# Patient Record
Sex: MALE | Race: WHITE | NOT HISPANIC OR LATINO | URBAN - METROPOLITAN AREA
[De-identification: names, ages, dates, MRNs, and addresses within clinical notes are randomized per-mention and may not be internally consistent; named-entity substitution may affect disease eponyms.]

---

## 2017-04-01 ENCOUNTER — EMERGENCY (EMERGENCY)
Facility: HOSPITAL | Age: 37
LOS: 0 days | Discharge: HOME | End: 2017-04-01
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
## 2017-06-27 DIAGNOSIS — S46.911A STRAIN OF UNSPECIFIED MUSCLE, FASCIA AND TENDON AT SHOULDER AND UPPER ARM LEVEL, RIGHT ARM, INITIAL ENCOUNTER: ICD-10-CM

## 2017-06-27 DIAGNOSIS — Z87.891 PERSONAL HISTORY OF NICOTINE DEPENDENCE: ICD-10-CM
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
## 2017-06-27 DIAGNOSIS — S49.91XA UNSPECIFIED INJURY OF RIGHT SHOULDER AND UPPER ARM, INITIAL ENCOUNTER: ICD-10-CM
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
## 2018-04-07 ENCOUNTER — EMERGENCY (EMERGENCY)
Facility: HOSPITAL | Age: 38
LOS: 0 days | Discharge: HOME | End: 2018-04-07

## 2018-04-07 VITALS
RESPIRATION RATE: 18 BRPM | HEART RATE: 113 BPM | DIASTOLIC BLOOD PRESSURE: 110 MMHG | TEMPERATURE: 98 F | SYSTOLIC BLOOD PRESSURE: 191 MMHG

## 2018-04-07 DIAGNOSIS — M54.5 LOW BACK PAIN: ICD-10-CM
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
## 2018-04-07 DIAGNOSIS — S39.012A STRAIN OF MUSCLE, FASCIA AND TENDON OF LOWER BACK, INITIAL ENCOUNTER: ICD-10-CM

## 2018-04-07 RX ORDER — METHOCARBAMOL 500 MG/1
2 TABLET, FILM COATED ORAL
Qty: 40 | Refills: 0 | OUTPATIENT
Start: 2018-04-07 | End: 2018-04-16

## 2018-04-07 RX ORDER — METHOCARBAMOL 500 MG/1
1000 TABLET, FILM COATED ORAL ONCE
Qty: 0 | Refills: 0 | Status: COMPLETED | OUTPATIENT
Start: 2018-04-07 | End: 2018-04-07

## 2018-04-07 RX ORDER — KETOROLAC TROMETHAMINE 30 MG/ML
30 SYRINGE (ML) INJECTION ONCE
Qty: 0 | Refills: 0 | Status: DISCONTINUED | OUTPATIENT
Start: 2018-04-07 | End: 2018-04-07

## 2018-04-07 RX ADMIN — METHOCARBAMOL 1000 MILLIGRAM(S): 500 TABLET, FILM COATED ORAL at 10:51

## 2018-04-07 RX ADMIN — Medication 30 MILLIGRAM(S): at 10:50

## 2018-04-07 NOTE — ED PROVIDER NOTE - PHYSICAL EXAMINATION
CONST: Well appearing in NAD  NECK: Non-tender, no meningeal signs  CARD: Normal S1 S2; Normal rate and rhythm  RESP: Equal BS B/L, No wheezes, rhonchi or rales. No distress  MS: Normal ROM in all extremities. No midline spinal tenderness. +R lumbar muscle tenderness.

## 2018-04-07 NOTE — ED PROVIDER NOTE - OBJECTIVE STATEMENT
Pt is a 36 yo M c/o R lower back pain after lifting a heavy patient just PTA. 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 Felt straining in lower back which is worse with movement. No radiation into LE's. No numbness or tingling.

## 2018-04-07 NOTE — ED PROVIDER NOTE - NS ED ROS FT
CONST: No fever, chills or bodyaches  EYES: No pain, redness, drainage or visual changes.  ENT: No ear pain or discharge, nasal discharge or congestion. No sore throat  CARD: No chest pain, palpitations  RESP: No SOB, cough, hemoptysis. No hx of asthma or COPD  MS: +Back pain

## 2018-11-14 ENCOUNTER — EMERGENCY (EMERGENCY)
Facility: HOSPITAL | Age: 38
LOS: 0 days | Discharge: HOME | End: 2018-11-14
Attending: EMERGENCY MEDICINE | Admitting: EMERGENCY MEDICINE

## 2018-11-14 VITALS
DIASTOLIC BLOOD PRESSURE: 94 MMHG | RESPIRATION RATE: 16 BRPM | TEMPERATURE: 97 F | SYSTOLIC BLOOD PRESSURE: 162 MMHG | WEIGHT: 240.08 LBS | HEART RATE: 94 BPM | OXYGEN SATURATION: 97 % | HEIGHT: 76 IN

## 2018-11-14 VITALS
TEMPERATURE: 98 F | DIASTOLIC BLOOD PRESSURE: 80 MMHG | HEART RATE: 76 BPM | SYSTOLIC BLOOD PRESSURE: 130 MMHG | OXYGEN SATURATION: 98 % | RESPIRATION RATE: 18 BRPM

## 2018-11-14 DIAGNOSIS — M25.569 PAIN IN UNSPECIFIED KNEE: ICD-10-CM
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
## 2018-11-14 DIAGNOSIS — M25.561 PAIN IN RIGHT KNEE: ICD-10-CM

## 2018-11-14 NOTE — ED PROVIDER NOTE - CARE PROVIDER_API CALL
Obed Mcintosh (MD), Orthopaedic Surgery  [facility] [place] [address]  Phone: [phone]  Fax: [phone]

## 2018-11-14 NOTE — ED PROVIDER NOTE - MEDICAL DECISION MAKING DETAILS
I personally evaluated the patient. I reviewed the Resident’s or Physician Assistant’s note (as assigned above), and agree with the findings and plan except as documented in my note. 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 Pt denies any direct trauma. On Exam: TTP to the right patella, FROM, no laxity, pulses intact,. Plan XR and reassess

## 2018-11-14 NOTE — ED PROVIDER NOTE - NS ED ROS FT
.MS:  + knee pain. No myalgia, muscle weakness, back pain.  Neuro:  No headache or weakness.  No LOC.  Skin:  No skin rash.   Endocrine: No history of thyroid disease or diabetes.  Except as documented in the HPI,  all other systems are negative.

## 2018-11-14 NOTE — ED PROVIDER NOTE - OBJECTIVE STATEMENT
Pt is a 36 y/o male who works for [employer] presents today for eval of right knee pain x 3 days, worsened when walking. 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 Pt denies direct trauma, fever, chills, weakness, numbness.

## 2020-10-30 ENCOUNTER — EMERGENCY (EMERGENCY)
Facility: HOSPITAL | Age: 40
LOS: 0 days | Discharge: HOME | End: 2020-10-30
Attending: EMERGENCY MEDICINE | Admitting: EMERGENCY MEDICINE
Payer: OTHER MISCELLANEOUS

## 2020-10-30 VITALS
HEART RATE: 94 BPM | OXYGEN SATURATION: 99 % | SYSTOLIC BLOOD PRESSURE: 153 MMHG | RESPIRATION RATE: 18 BRPM | DIASTOLIC BLOOD PRESSURE: 106 MMHG

## 2020-10-30 VITALS
WEIGHT: 240.08 LBS | RESPIRATION RATE: 18 BRPM | DIASTOLIC BLOOD PRESSURE: 101 MMHG | HEART RATE: 120 BPM | HEIGHT: 76 IN | SYSTOLIC BLOOD PRESSURE: 176 MMHG | TEMPERATURE: 98 F | OXYGEN SATURATION: 97 %

## 2020-10-30 DIAGNOSIS — M25.511 PAIN IN RIGHT SHOULDER: ICD-10-CM

## 2020-10-30 DIAGNOSIS — I10 ESSENTIAL (PRIMARY) HYPERTENSION: ICD-10-CM

## 2020-10-30 PROCEDURE — 99284 EMERGENCY DEPT VISIT MOD MDM: CPT

## 2020-10-30 PROCEDURE — 73030 X-RAY EXAM OF SHOULDER: CPT | Mod: 26,RT

## 2020-10-30 RX ORDER — VALSARTAN 80 MG/1
0 TABLET ORAL
Qty: 0 | Refills: 0 | DISCHARGE

## 2020-10-30 RX ORDER — IBUPROFEN 200 MG
800 TABLET ORAL ONCE
Refills: 0 | Status: COMPLETED | OUTPATIENT
Start: 2020-10-30 | End: 2020-10-30

## 2020-10-30 RX ADMIN — Medication 800 MILLIGRAM(S): at 14:17

## 2020-10-30 NOTE — ED ADULT TRIAGE NOTE - NS ED TRIAGE AVPU SCALE
General
Alert-The patient is alert, awake and responds to voice. The patient is oriented to time, place, and person. The triage nurse is able to obtain subjective information.

## 2020-10-30 NOTE — ED PROVIDER NOTE - CARE PROVIDER_API CALL
Obed Mcintosh  ORTHOPAEDIC SURGERY  [address] [place] [address]  Phone: [phone]  Fax: [phone]  Follow Up Time: 1-3 Days

## 2020-10-30 NOTE — ED PROVIDER NOTE - OBJECTIVE STATEMENT
Patient is a 38 yo M w/ hx of HTN p/w right shoulder pain. 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 Denies numbness/tingling/focal weakness of the extremity.

## 2020-10-30 NOTE — ED PROVIDER NOTE - CLINICAL SUMMARY MEDICAL DECISION MAKING FREE TEXT BOX
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

## 2020-10-30 NOTE — ED PROVIDER NOTE - PATIENT PORTAL LINK FT
You can access the FollowMyHealth Patient Portal offered by [facility] by registering at the following website: [email or web address]. By joining [facility]’s FollowMyHealth portal, you will also be able to view your health information using other applications (apps) compatible with our system.

## 2020-10-30 NOTE — ED ADULT TRIAGE NOTE - CHIEF COMPLAINT QUOTE
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

## 2020-10-30 NOTE — ED PROVIDER NOTE - ATTENDING CONTRIBUTION TO CARE
I was present for and supervised the key and critical aspects of the procedures performed during the care of the patient. 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

## 2020-10-30 NOTE — ED PROVIDER NOTE - PHYSICAL EXAMINATION
CONSTITUTIONAL: Well-developed; well-nourished; in no acute distress.   SKIN: warm, dry.  HEAD: Normocephalic; atraumatic.  EYES: PERRL, EOMI, no conjunctival erythema  ENT: No nasal discharge; airway clear.  NECK: Supple; non tender.  EXT: Limited abduction beyond 90 degrees; limited flexion of the right shoulder. Tenderness to palpation at the glenohumeral joint. Radial and ulnar pulses 2+ to the RUE. Brisk cap refill.   NEURO: strength and sensation intact to the right UE.   PSYCH: Cooperative, appropriate.

## 2020-10-30 NOTE — ED PROVIDER NOTE - NS ED ROS FT
Constitutional: No fevers. No trauma.   MS:  right shoulder pain.   Neuro:  No numbness.   Skin:  No skin rash.   Endocrine: No history of thyroid disease or diabetes.

## 2022-02-13 NOTE — ED PROVIDER NOTE - PHYSICAL EXAMINATION
VITAL SIGNS: I have reviewed nursing notes and confirm.  CONSTITUTIONAL: Well-developed; well-nourished; in no acute distress.   SKIN: skin exam is warm and dry, no acute rash.    HEAD: Normocephalic; atraumatic.  EYES: conjunctiva and sclera clear.  ABD: Normal bowel sounds; soft; non-distended; non-tender  EXT: TTP to right knee, ambulating well, bearing weight, Normal ROM.  No clubbing, cyanosis or edema.   NEURO: Alert, oriented, grossly unremarkable
Implemented All Universal Safety Interventions:  [place] to call system. Call bell, personal items and telephone within reach. Instruct patient to call for assistance. Room bathroom lighting operational. Non-slip footwear when patient is off stretcher. Physically safe environment: no spills, clutter or unnecessary equipment. Stretcher in lowest position, wheels locked, appropriate side rails in place.
